# Patient Record
Sex: MALE | Race: ASIAN | NOT HISPANIC OR LATINO | URBAN - METROPOLITAN AREA
[De-identification: names, ages, dates, MRNs, and addresses within clinical notes are randomized per-mention and may not be internally consistent; named-entity substitution may affect disease eponyms.]

---

## 2023-10-11 ENCOUNTER — EMERGENCY (EMERGENCY)
Facility: HOSPITAL | Age: 63
LOS: 0 days | Discharge: HOME | End: 2023-10-12
Attending: EMERGENCY MEDICINE
Payer: MEDICAID

## 2023-10-11 VITALS
WEIGHT: 158.73 LBS | SYSTOLIC BLOOD PRESSURE: 143 MMHG | HEART RATE: 68 BPM | HEIGHT: 64.96 IN | OXYGEN SATURATION: 99 % | DIASTOLIC BLOOD PRESSURE: 85 MMHG | TEMPERATURE: 98 F | RESPIRATION RATE: 18 BRPM

## 2023-10-11 DIAGNOSIS — Z79.82 LONG TERM (CURRENT) USE OF ASPIRIN: ICD-10-CM

## 2023-10-11 DIAGNOSIS — R06.02 SHORTNESS OF BREATH: ICD-10-CM

## 2023-10-11 DIAGNOSIS — K21.9 GASTRO-ESOPHAGEAL REFLUX DISEASE WITHOUT ESOPHAGITIS: ICD-10-CM

## 2023-10-11 DIAGNOSIS — R11.0 NAUSEA: ICD-10-CM

## 2023-10-11 DIAGNOSIS — I25.10 ATHEROSCLEROTIC HEART DISEASE OF NATIVE CORONARY ARTERY WITHOUT ANGINA PECTORIS: ICD-10-CM

## 2023-10-11 DIAGNOSIS — R53.1 WEAKNESS: ICD-10-CM

## 2023-10-11 DIAGNOSIS — E03.9 HYPOTHYROIDISM, UNSPECIFIED: ICD-10-CM

## 2023-10-11 DIAGNOSIS — I10 ESSENTIAL (PRIMARY) HYPERTENSION: ICD-10-CM

## 2023-10-11 DIAGNOSIS — F41.9 ANXIETY DISORDER, UNSPECIFIED: ICD-10-CM

## 2023-10-11 DIAGNOSIS — Z95.1 PRESENCE OF AORTOCORONARY BYPASS GRAFT: ICD-10-CM

## 2023-10-11 LAB
ALBUMIN SERPL ELPH-MCNC: 3.8 G/DL — SIGNIFICANT CHANGE UP (ref 3.3–5)
ALP SERPL-CCNC: 76 U/L — SIGNIFICANT CHANGE UP (ref 40–120)
ALT FLD-CCNC: 40 U/L — SIGNIFICANT CHANGE UP (ref 12–78)
ANION GAP SERPL CALC-SCNC: 8 MMOL/L — SIGNIFICANT CHANGE UP (ref 5–17)
AST SERPL-CCNC: 24 U/L — SIGNIFICANT CHANGE UP (ref 15–37)
BASOPHILS # BLD AUTO: 0.03 K/UL — SIGNIFICANT CHANGE UP (ref 0–0.2)
BASOPHILS NFR BLD AUTO: 0.3 % — SIGNIFICANT CHANGE UP (ref 0–2)
BILIRUB SERPL-MCNC: 1.1 MG/DL — SIGNIFICANT CHANGE UP (ref 0.2–1.2)
BUN SERPL-MCNC: 14 MG/DL — SIGNIFICANT CHANGE UP (ref 7–23)
CALCIUM SERPL-MCNC: 8.5 MG/DL — SIGNIFICANT CHANGE UP (ref 8.5–10.1)
CHLORIDE SERPL-SCNC: 106 MMOL/L — SIGNIFICANT CHANGE UP (ref 96–108)
CO2 SERPL-SCNC: 22 MMOL/L — SIGNIFICANT CHANGE UP (ref 22–31)
CREAT SERPL-MCNC: 1.05 MG/DL — SIGNIFICANT CHANGE UP (ref 0.5–1.3)
EGFR: 79 ML/MIN/1.73M2 — SIGNIFICANT CHANGE UP
EOSINOPHIL # BLD AUTO: 0.02 K/UL — SIGNIFICANT CHANGE UP (ref 0–0.5)
EOSINOPHIL NFR BLD AUTO: 0.2 % — SIGNIFICANT CHANGE UP (ref 0–6)
GLUCOSE SERPL-MCNC: 161 MG/DL — HIGH (ref 70–99)
HCT VFR BLD CALC: 39.8 % — SIGNIFICANT CHANGE UP (ref 39–50)
HGB BLD-MCNC: 13.8 G/DL — SIGNIFICANT CHANGE UP (ref 13–17)
IMM GRANULOCYTES NFR BLD AUTO: 0.5 % — SIGNIFICANT CHANGE UP (ref 0–0.9)
LYMPHOCYTES # BLD AUTO: 1.29 K/UL — SIGNIFICANT CHANGE UP (ref 1–3.3)
LYMPHOCYTES # BLD AUTO: 12.6 % — LOW (ref 13–44)
MAGNESIUM SERPL-MCNC: 2.1 MG/DL — SIGNIFICANT CHANGE UP (ref 1.6–2.6)
MCHC RBC-ENTMCNC: 32.2 PG — SIGNIFICANT CHANGE UP (ref 27–34)
MCHC RBC-ENTMCNC: 34.7 G/DL — SIGNIFICANT CHANGE UP (ref 32–36)
MCV RBC AUTO: 93 FL — SIGNIFICANT CHANGE UP (ref 80–100)
MONOCYTES # BLD AUTO: 0.35 K/UL — SIGNIFICANT CHANGE UP (ref 0–0.9)
MONOCYTES NFR BLD AUTO: 3.4 % — SIGNIFICANT CHANGE UP (ref 2–14)
NEUTROPHILS # BLD AUTO: 8.46 K/UL — HIGH (ref 1.8–7.4)
NEUTROPHILS NFR BLD AUTO: 83 % — HIGH (ref 43–77)
NRBC # BLD: 0 /100 WBCS — SIGNIFICANT CHANGE UP (ref 0–0)
NT-PROBNP SERPL-SCNC: 51 PG/ML — SIGNIFICANT CHANGE UP (ref 0–125)
PLATELET # BLD AUTO: 203 K/UL — SIGNIFICANT CHANGE UP (ref 150–400)
POTASSIUM SERPL-MCNC: 3.5 MMOL/L — SIGNIFICANT CHANGE UP (ref 3.5–5.3)
POTASSIUM SERPL-SCNC: 3.5 MMOL/L — SIGNIFICANT CHANGE UP (ref 3.5–5.3)
PROT SERPL-MCNC: 7.5 GM/DL — SIGNIFICANT CHANGE UP (ref 6–8.3)
RBC # BLD: 4.28 M/UL — SIGNIFICANT CHANGE UP (ref 4.2–5.8)
RBC # FLD: 11.9 % — SIGNIFICANT CHANGE UP (ref 10.3–14.5)
SODIUM SERPL-SCNC: 136 MMOL/L — SIGNIFICANT CHANGE UP (ref 135–145)
TROPONIN I, HIGH SENSITIVITY RESULT: 10.6 NG/L — SIGNIFICANT CHANGE UP
TSH SERPL-MCNC: 0.19 UIU/ML — LOW (ref 0.36–3.74)
WBC # BLD: 10.2 K/UL — SIGNIFICANT CHANGE UP (ref 3.8–10.5)
WBC # FLD AUTO: 10.2 K/UL — SIGNIFICANT CHANGE UP (ref 3.8–10.5)

## 2023-10-11 PROCEDURE — 99285 EMERGENCY DEPT VISIT HI MDM: CPT

## 2023-10-11 PROCEDURE — 70450 CT HEAD/BRAIN W/O DYE: CPT | Mod: 26,MA

## 2023-10-11 RX ORDER — SODIUM CHLORIDE 9 MG/ML
1000 INJECTION INTRAMUSCULAR; INTRAVENOUS; SUBCUTANEOUS ONCE
Refills: 0 | Status: COMPLETED | OUTPATIENT
Start: 2023-10-11 | End: 2023-10-11

## 2023-10-11 RX ORDER — METOCLOPRAMIDE HCL 10 MG
10 TABLET ORAL ONCE
Refills: 0 | Status: COMPLETED | OUTPATIENT
Start: 2023-10-11 | End: 2023-10-11

## 2023-10-11 RX ADMIN — SODIUM CHLORIDE 1000 MILLILITER(S): 9 INJECTION INTRAMUSCULAR; INTRAVENOUS; SUBCUTANEOUS at 23:17

## 2023-10-11 RX ADMIN — Medication 10 MILLIGRAM(S): at 23:17

## 2023-10-11 RX ADMIN — Medication 1 MILLIGRAM(S): at 23:17

## 2023-10-11 NOTE — ED ADULT NURSE NOTE - CHIEF COMPLAINT QUOTE
bibems from home difficulty breathing.  Pt had 2x cardiac stents placed @[facility] today.  Pt Aox3, no neuro deficits noted in triage, denies chest pain.  O2 sat 97% on room air.   hx of  CAD, HTN.

## 2023-10-11 NOTE — ED PROVIDER NOTE - CARE PLAN
Principal Discharge DX:	Shortness of breath  Secondary Diagnosis:	General weakness  Secondary Diagnosis:	Anxiety   1

## 2023-10-11 NOTE — ED PROVIDER NOTE - PHYSICAL EXAMINATION
Vitals: HTN at 143/85, otherwise WNL  Gen: AAOx3, NAD, sitting comfortably in stretcher, responsive, anxious   Head: ncat, perrla, eomi b/l  Neck: supple, no lymphadenopathy, no midline deviation  Heart: rrr, no m/r/g  Lungs: CTA b/l, no rales/ronchi/wheezes  Abd: soft, nontender, non-distended, no rebound or guarding  Ext: no clubbing/cyanosis/edema  Neuro: sensation and muscle strength intact b/l, steady gait

## 2023-10-11 NOTE — ED ADULT TRIAGE NOTE - CHIEF COMPLAINT QUOTE
bibems from home difficulty breathing.  Pt had 2x cardiac stents placed @[facility] today.  Pt Aox3, no neuro deficits noted in triage.   hx of  CAD, HTN. bibems from home difficulty breathing.  Pt had 2x cardiac stents placed @[facility] today.  Pt Aox3, no neuro deficits noted in triage, denies chest pain.  O2 sat 97% on room air.   hx of  CAD, HTN.

## 2023-10-11 NOTE — ED ADULT NURSE NOTE - MODE OF DISCHARGE
Wheelchair wheelchair for comfort, pt ambulated to and from chair with steady gait, and ambulated into vehicle/Wheelchair

## 2023-10-11 NOTE — ED PROVIDER NOTE - CARE PROVIDER_API CALL
Jeremy Sin  Cardiology  [address] [place] [id number]  Phone: [phone]  Fax: [phone]  Follow Up Time: Urgent

## 2023-10-11 NOTE — ED PROVIDER NOTE - OBJECTIVE STATEMENT
Pt. prefers that son translate:  62 yo M with sob, general weakness, anxiety, foggy memory, nausea after having angiogram today at [facility].  Son thinks it's more likely anxiety, given that pt. tends to "get worried when having tests like this."  Son was concerned given pt.'s history of heart disease.  No other complaints.   ROS: negative for fever, cough, headache, chest pain, abd pain, vomiting, diarrhea, rash, paresthesia, and focal weakness--all other systems reviewed are negative.   PMH: hypothyroid, htn, CAD s/p bypass in past, gerd, anxiety; Meds: levothyroxine, amlodipine, metoprolol, omeprazole, asa, Lipitor; SH: Denies smoking/drinking/drug use

## 2023-10-11 NOTE — ED ADULT NURSE NOTE - OBJECTIVE STATEMENT
Pt presents with son at bedside. Via son, c/o "he's been having some shortness of breath today." The son adds that he had a procedure, angiogram performed earlier today at [facility]. Pt is a&ox3, asks if it is ok for his son to speak for him. Denies headache, chest pain, cough, n/v, abdominal pain. Pt presents with son at bedside. Via son, c/o "he's been having some shortness of breath today." The son adds that he had a procedure, angiogram performed earlier today at [facility]. Pt is a&ox3, asks if it is ok for his son to speak for him. Denies headache, chest pain, cough, n/v, abdominal pain. Pt on room air, respirations easy and unlabored.

## 2023-10-11 NOTE — ED PROVIDER NOTE - PATIENT PORTAL LINK FT
You can access the FollowMyHealth Patient Portal offered by [facility] by registering at the following website: [email or web address]. By joining [employer]’s FollowMyHealth portal, you will also be able to view your health information using other applications (apps) compatible with our system.

## 2023-10-11 NOTE — ED PROVIDER NOTE - PROGRESS NOTE DETAILS
Results reported to patient--grossly benign, labs unremarkable, XR clear  Pt. reports feeling better after meds  pt. agrees to f/u with primary care outpt., cardio referral given for urgent f/u   pt. understands to return to ED if symptoms worsen; will d/c

## 2023-10-11 NOTE — ED PROVIDER NOTE - CLINICAL SUMMARY MEDICAL DECISION MAKING FREE TEXT BOX
64 yo M with multiple complaints after angiogram, concerning for CVA, ACS, CHF, pna less likely, possible stress reaction, doubt acute thyroid disorder, doubt PE  -hydration bolus, ativan 1 mg for anxiety, reglan for nausea, monitor, cbc, cmp, trop, dimer, bnp, mag, tsh, CT brain, CXR, ekg  -f/u results, reeval

## 2023-10-11 NOTE — ED ADULT NURSE NOTE - NSFALLUNIVINTERV_ED_ALL_ED
Bed/Stretcher in lowest position, wheels locked, appropriate side rails in place/Call bell, personal items and telephone in reach/Instruct patient to call for assistance before getting out of bed/chair/stretcher/Non-slip footwear applied when patient is off stretcher/[place]ilt to call system/Physically safe environment - no spills, clutter or unnecessary equipment/Purposeful proactive rounding/Room/bathroom lighting operational, light cord in reach

## 2023-10-12 VITALS
RESPIRATION RATE: 16 BRPM | OXYGEN SATURATION: 98 % | DIASTOLIC BLOOD PRESSURE: 71 MMHG | TEMPERATURE: 98 F | SYSTOLIC BLOOD PRESSURE: 162 MMHG | HEART RATE: 82 BPM

## 2023-10-12 LAB — D DIMER BLD IA.RAPID-MCNC: <150 NG/ML DDU — SIGNIFICANT CHANGE UP

## 2023-10-12 PROCEDURE — 93010 ELECTROCARDIOGRAM REPORT: CPT

## 2023-10-12 PROCEDURE — 71045 X-RAY EXAM CHEST 1 VIEW: CPT | Mod: 26
